# Patient Record
Sex: MALE | Race: WHITE
[De-identification: names, ages, dates, MRNs, and addresses within clinical notes are randomized per-mention and may not be internally consistent; named-entity substitution may affect disease eponyms.]

---

## 2023-05-24 ENCOUNTER — HOSPITAL ENCOUNTER (EMERGENCY)
Age: 33
LOS: 1 days | Discharge: HOME | End: 2023-05-25
Payer: COMMERCIAL

## 2023-05-24 VITALS
RESPIRATION RATE: 18 BRPM | HEART RATE: 88 BPM | TEMPERATURE: 97.88 F | SYSTOLIC BLOOD PRESSURE: 144 MMHG | DIASTOLIC BLOOD PRESSURE: 91 MMHG | OXYGEN SATURATION: 100 %

## 2023-05-24 DIAGNOSIS — R55: Primary | ICD-10-CM

## 2023-05-24 DIAGNOSIS — R68.83: ICD-10-CM

## 2023-05-24 DIAGNOSIS — Z28.310: ICD-10-CM

## 2023-05-24 DIAGNOSIS — B34.9: ICD-10-CM

## 2023-05-24 DIAGNOSIS — F17.220: ICD-10-CM

## 2023-05-24 PROCEDURE — A4216 STERILE WATER/SALINE, 10 ML: HCPCS

## 2023-05-24 PROCEDURE — 85025 COMPLETE CBC W/AUTO DIFF WBC: CPT

## 2023-05-24 PROCEDURE — 80048 BASIC METABOLIC PNL TOTAL CA: CPT

## 2023-05-24 PROCEDURE — 93005 ELECTROCARDIOGRAM TRACING: CPT

## 2023-05-24 PROCEDURE — 99284 EMERGENCY DEPT VISIT MOD MDM: CPT

## 2023-05-25 VITALS
OXYGEN SATURATION: 97 % | RESPIRATION RATE: 15 BRPM | SYSTOLIC BLOOD PRESSURE: 136 MMHG | DIASTOLIC BLOOD PRESSURE: 91 MMHG | HEART RATE: 64 BPM

## 2023-05-25 LAB
ANION GAP: 6 (ref 5–15)
BUN SERPL-MCNC: 11 MG/DL (ref 7–18)
BUN/CREAT RATIO: 9.2 RATIO (ref 10–20)
CALCIUM SERPL-MCNC: 9.2 MG/DL (ref 8.5–10.1)
CARBON DIOXIDE: 29 MMOL/L (ref 21–32)
CHLORIDE: 103 MMOL/L (ref 98–107)
DEPRECATED RDW RBC: 35.2 FL (ref 35.1–43.9)
ERYTHROCYTE [DISTWIDTH] IN BLOOD: 11.6 % (ref 11.6–14.6)
EST GLOM FILT RATE - AFR AMER: 90 ML/MIN (ref 60–?)
ESTIMATED CREATININE CLEARANCE: 95 ML/MIN
GLUCOSE: 132 MG/DL (ref 74–106)
HCT VFR BLD AUTO: 45.4 % (ref 40–54)
HEMOGLOBIN: 15.5 G/DL (ref 13–16.5)
HGB BLD-MCNC: 15.5 G/DL (ref 13–16.5)
IMMATURE GRANULOCYTES COUNT: 0.04 X10^3/UL (ref 0–0)
MCV RBC: 84.4 FL (ref 80–94)
MEAN CORP HGB CONC: 34.1 G/DL (ref 32–36)
MEAN PLATELET VOL.: 9.9 FL (ref 6.2–12)
NRBC FLAGGED BY ANALYZER: 0 % (ref 0–5)
PLATELET # BLD: 247 K/MM3 (ref 150–450)
PLATELET COUNT: 247 K/MM3 (ref 150–450)
POTASSIUM: 3.9 MMOL/L (ref 3.5–5.1)
RBC # BLD AUTO: 5.38 M/MM3 (ref 4.6–6.2)
RBC DISTRIBUTION WIDTH CV: 11.6 % (ref 11.6–14.6)
RBC DISTRIBUTION WIDTH SD: 35.2 FL (ref 35.1–43.9)
WBC # BLD AUTO: 9.8 K/MM3 (ref 4.4–11)
WHITE BLOOD COUNT: 9.8 K/MM3 (ref 4.4–11)

## 2023-07-15 ENCOUNTER — HOSPITAL ENCOUNTER (EMERGENCY)
Age: 33
Discharge: HOME | End: 2023-07-15
Payer: COMMERCIAL

## 2023-07-15 VITALS
RESPIRATION RATE: 16 BRPM | OXYGEN SATURATION: 100 % | BODY MASS INDEX: 24.8 KG/M2 | DIASTOLIC BLOOD PRESSURE: 100 MMHG | HEART RATE: 80 BPM | TEMPERATURE: 97.9 F | SYSTOLIC BLOOD PRESSURE: 154 MMHG

## 2023-07-15 DIAGNOSIS — F41.0: Primary | ICD-10-CM

## 2023-07-15 DIAGNOSIS — Z79.899: ICD-10-CM

## 2023-07-15 DIAGNOSIS — R00.2: ICD-10-CM

## 2023-07-15 DIAGNOSIS — F17.220: ICD-10-CM

## 2023-07-15 LAB
ANION GAP: 5 (ref 5–15)
BUN SERPL-MCNC: 12 MG/DL (ref 7–18)
BUN/CREAT RATIO: 9.9 RATIO (ref 10–20)
CALCIUM SERPL-MCNC: 8.8 MG/DL (ref 8.5–10.1)
CARBON DIOXIDE: 31 MMOL/L (ref 21–32)
CHLORIDE: 103 MMOL/L (ref 98–107)
DEPRECATED RDW RBC: 36.6 FL (ref 35.1–43.9)
ERYTHROCYTE [DISTWIDTH] IN BLOOD: 11.7 % (ref 11.6–14.6)
EST GLOM FILT RATE - AFR AMER: 89 ML/MIN (ref 60–?)
ESTIMATED CREATININE CLEARANCE: 93.35 ML/MIN
GLUCOSE: 99 MG/DL (ref 74–106)
HCT VFR BLD AUTO: 46.7 % (ref 40–54)
HEMOGLOBIN: 16.3 G/DL (ref 13–16.5)
HGB BLD-MCNC: 16.3 G/DL (ref 13–16.5)
IMMATURE GRANULOCYTES COUNT: 0.03 X10^3/UL (ref 0–0)
MCV RBC: 86.2 FL (ref 80–94)
MEAN CORP HGB CONC: 34.9 G/DL (ref 32–36)
MEAN PLATELET VOL.: 10 FL (ref 6.2–12)
NRBC FLAGGED BY ANALYZER: 0 % (ref 0–5)
PLATELET # BLD: 267 K/MM3 (ref 150–450)
PLATELET COUNT: 267 K/MM3 (ref 150–450)
POTASSIUM: 3.5 MMOL/L (ref 3.5–5.1)
RBC # BLD AUTO: 5.42 M/MM3 (ref 4.6–6.2)
RBC DISTRIBUTION WIDTH CV: 11.7 % (ref 11.6–14.6)
RBC DISTRIBUTION WIDTH SD: 36.6 FL (ref 35.1–43.9)
TROPONIN-I HS: 3 PG/ML (ref 3–78)
WBC # BLD AUTO: 8.2 K/MM3 (ref 4.4–11)
WHITE BLOOD COUNT: 8.2 K/MM3 (ref 4.4–11)

## 2023-07-15 PROCEDURE — 85025 COMPLETE CBC W/AUTO DIFF WBC: CPT

## 2023-07-15 PROCEDURE — 93005 ELECTROCARDIOGRAM TRACING: CPT

## 2023-07-15 PROCEDURE — 99283 EMERGENCY DEPT VISIT LOW MDM: CPT

## 2023-07-15 PROCEDURE — 80048 BASIC METABOLIC PNL TOTAL CA: CPT

## 2023-07-15 PROCEDURE — 84484 ASSAY OF TROPONIN QUANT: CPT

## 2023-07-15 PROCEDURE — A4216 STERILE WATER/SALINE, 10 ML: HCPCS

## 2025-01-07 ENCOUNTER — HOSPITAL ENCOUNTER (OUTPATIENT)
Dept: HOSPITAL 100 - VSLAB | Age: 35
Discharge: HOME | End: 2025-01-07
Payer: COMMERCIAL

## 2025-01-07 DIAGNOSIS — I10: Primary | ICD-10-CM

## 2025-01-07 LAB
ALANINE AMINOTRANSFER ALT/SGPT: 64 U/L (ref 16–61)
ALBUMIN SERPL-MCNC: 3.8 G/DL (ref 3.2–5)
ALKALINE PHOSPHATASE: 82 U/L (ref 45–117)
ANION GAP: 5 (ref 5–15)
AST(SGOT): 84 U/L (ref 15–37)
BUN SERPL-MCNC: 15 MG/DL (ref 7–18)
BUN/CREAT RATIO: 12 RATIO (ref 10–20)
CALCIUM SERPL-MCNC: 8.7 MG/DL (ref 8.5–10.1)
CARBON DIOXIDE: 29 MMOL/L (ref 21–32)
CHLORIDE: 103 MMOL/L (ref 98–107)
DEPRECATED RDW RBC: 37.7 FL (ref 35.1–43.9)
ERYTHROCYTE [DISTWIDTH] IN BLOOD: 11.9 % (ref 11.6–14.6)
EST GLOM FILT RATE - AFR AMER: 85 ML/MIN (ref 60–?)
GLOBULIN: 3.5 G/DL (ref 2.2–4.2)
GLUCOSE: 94 MG/DL (ref 74–106)
HCT VFR BLD AUTO: 44.2 % (ref 40–54)
HEMOGLOBIN: 14.8 G/DL (ref 13–16.5)
HGB BLD-MCNC: 14.8 G/DL (ref 13–16.5)
IMMATURE GRANULOCYTES COUNT: 0.03 X10^3/UL (ref 0–0)
MCV RBC: 86.7 FL (ref 80–94)
MEAN CORP HGB CONC: 33.5 G/DL (ref 32–36)
MEAN PLATELET VOL.: 9.9 FL (ref 6.2–12)
NRBC FLAGGED BY ANALYZER: 0 % (ref 0–5)
PLATELET # BLD: 289 K/MM3 (ref 150–450)
PLATELET COUNT: 289 K/MM3 (ref 150–450)
POTASSIUM: 4.1 MMOL/L (ref 3.5–5.1)
RBC # BLD AUTO: 5.1 M/MM3 (ref 4.6–6.2)
RBC DISTRIBUTION WIDTH CV: 11.9 % (ref 11.6–14.6)
RBC DISTRIBUTION WIDTH SD: 37.7 FL (ref 35.1–43.9)
WBC # BLD AUTO: 8.4 K/MM3 (ref 4.4–11)
WHITE BLOOD COUNT: 8.4 K/MM3 (ref 4.4–11)

## 2025-01-07 PROCEDURE — 85025 COMPLETE CBC W/AUTO DIFF WBC: CPT

## 2025-01-07 PROCEDURE — 84443 ASSAY THYROID STIM HORMONE: CPT

## 2025-01-07 PROCEDURE — 36415 COLL VENOUS BLD VENIPUNCTURE: CPT

## 2025-01-07 PROCEDURE — 80053 COMPREHEN METABOLIC PANEL: CPT

## 2025-01-10 NOTE — XMS RPT_ITS
Comprehensive CCD (C-CDA v2.1)  
  
                          Created on: January 10, 2025  
  
  
SANTO MR. AZAR PAGAN  
External Reference #: CDR,[id number]:[id number]  
: 1990  
Sex: Male  
  
Demographics  
  
  
                                        Address             [address] [place]  [address]  
   
                                        Home Phone          [phone]  
   
                                        Work Phone          [phone]  
   
                                        Home Phone          [phone]  
   
                                        Work Phone          [phone]  
   
                                        Preferred Language  English  
   
                                        Marital Status        
   
                                        Anglican Affiliation Unknown  
   
                                        Race                White  
   
                                        Ethnic Group        Unknown  
  
  
Author  
  
  
                                        Organization        [facility]  
[facility]  
  
  
Care Team Providers  
  
  
                                Care Team Member Name Role            Phone  
   
                                FANNY BAILEY Attending       Unavailrandall goodman  
  
  
  
Results  
  
  
                      Test Name  Value      Interpretation Reference Range Facil  
ity  
   
                                                    Basic metabolic 2000 panelon  
 2023   
   
                                                    Anion gap   
[Moles/Vol]     12 mmol/L       Normal          9-18            [facility]  
   
                                        Comment on above:   Order Comment: Speci  
men Type: BLOOD SPECIMEN  
Ordering Facility: [facility]  
Address: [address] [place] [address]   
   
                                                            Performed By: #### 2  
4321-2 ####  
[facility]  
CLIA [id number]  
[address] [place] [address] [place]   
   
                      Calcium [Mass/Vol] 10.4 mg/dL High       8.5-10.2   [facility]  
   
                                        Comment on above:   Order Comment: Speci  
men Type: BLOOD SPECIMEN  
Ordering Facility: [facility]  
Address: [address] [place] [address]   
   
                                                            Performed By: #### 2  
4321-2 ####  
[facility]  
CLIA [id number]  
[address] [place] [address] [place]   
   
                                                    Chloride   
[Moles/Vol]     100 mmol/L      Normal                    [facility]  
   
                                        Comment on above:   Order Comment: Speci  
men Type: BLOOD SPECIMEN  
Ordering Facility: [facility]  
Address: [address] [place] [address]   
   
                                                            Performed By: #### 2  
4321-2 ####  
[facility]  
CLIA [id number]  
[address] [place] [id number] [place]   
   
                      CO2 [Moles/Vol] 27 mmol/L  Normal     22-30      [facility]  
   
                                        Comment on above:   Order Comment: Speci  
men Type: BLOOD SPECIMEN  
Ordering Facility: [facility]  
Address: [address] [place] [address]   
   
                                                            Performed By: #### 2  
4321-2 ####  
[facility]  
CLIA [id number]  
[facility] [place] [address] [place]   
   
                                                    Creatinine   
[Mass/Vol]      1.16 mg/dL      Normal          0.73-1.22       [facility]  
   
                                        Comment on above:   Order Comment: Speci  
men Type: BLOOD SPECIMEN  
Ordering Facility: [facility]  
Address: [address] [place] [address]   
   
                                                            Performed By: #### 2  
4321-2 ####  
[facility]  
CLIA [id number]  
[address] [place] [address] [place]   
   
                                                    Creatinine and   
Glomerular   
filtration   
rate.predicted   
panel (S/P/Bld) 86 mL/min/1.73m??? Normal          >=60            [facility]  
   
                                        Comment on above:   Order Comment: Speci  
men Type: BLOOD SPECIMEN  
Ordering Facility: [facility]  
Address: [address] [place] [address]   
   
                                                            Result Comment: Sweetie  
mated Glomerular Filtration Rate (eGFR) is   
calculated using the  CKD-EPI creatinine equation. This   
equation utilizes serum creatinine, sex, and age as parameters.   
The creatinine assay has traceable calibration to isotope   
dilution-mass spectrometry. Refer to KDIGO guidelines for clinical   
interpretation. In patients with unstable renal function, e.g.   
those with acute kidney injury, the eGFR may not accurately   
reflect actual GFR.   
   
                                                            Performed By: #### 2  
4321-2 ####  
[facility]  
CLIA [id number]  
[address] [place] [address] [place]   
   
                      Glucose [Mass/Vol] 84 mg/dL   Normal     74-99      [facility]  
   
                                        Comment on above:   Order Comment: Speci  
men Type: BLOOD SPECIMEN  
Ordering Facility: [facility]  
Address: [address] [place] [address], [place] [address]   
   
                                                            Result Comment: The   
American Diabetes Association (ADA) provides   
guidance for cutoff values for fasting glucose and random glucose.   
The ADA defines fasting as no caloric intake for at least 8 hours.   
Fasting plasma glucose results between 100 to 125 mg/dL indicate   
increased risk for diabetes (prediabetes).  
Fasting plasma glucose results greater than or equal to 126 mg/dL   
meet the criteria for diagnosis of diabetes. In the absence of   
unequivocal hyperglycemia, results should be confirmed by repeat   
testing. In a patient with classic symptoms of hyperglycemia or   
hyperglycemic crisis, random plasma glucose results greater than   
or equal to 200 mg/dL meet the criteria for diagnosis of diabetes.  
Reference: Standards of Medical Care in Diabetes 2016, American   
Diabetes Association. Diabetes Care. 2016.39(Suppl 1).   
   
                                                            Performed By: #### 2  
4321-2 ####  
[facility]  
CLIA [id number]  
[address] [place] [address] [place]   
   
                                                    Potassium   
[Moles/Vol]     4.5 mmol/L      Normal          3.7-5.1         [facility]  
   
                                        Comment on above:   Order Comment: Speci  
men Type: BLOOD SPECIMEN  
Ordering Facility: [facility]  
Address: [address] [place] [address]   
   
                                                            Performed By: #### 2  
4321-2 ####  
[facility]  
CLIA [id number]  
[address] [place] [address] [place]   
   
                      Sodium [Moles/Vol] 139 mmol/L Normal     136-144    [facility]  
   
                                        Comment on above:   Order Comment: Speci  
men Type: BLOOD SPECIMEN  
Ordering Facility: [facility]  
Address: [address] [place] [address]   
   
                                                            Performed By: #### 2  
4321-2 ####  
[facility]  
CLIA [id number]  
[address] [place] [id number] [place]   
   
                                                    Urea nitrogen   
[Mass/Vol]      10 mg/dL        Normal          9-24            [facility]  
   
                                        Comment on above:   Order Comment: Speci  
men Type: BLOOD SPECIMEN  
Ordering Facility: [facility]  
Address: [address] [place] [address]   
   
                                                            Performed By: #### 2  
4321-2 ####  
[facility]  
CLIA [id number]  
[address] [place] [address] [place]   
   
                                                    25(OH)D3 [facility] 2023   
   
                                                    25-hydroxyvitamin   
D3 [Mass/Vol]   22.4 ng/mL      Low             31.0-80.0       [facility]  
   
                                        Comment on above:   Order Comment: Speci  
men Type: BLOOD SPECIMEN  
Ordering Facility: [facility]  
Address: [address] [place] [address]   
   
                                                            Result Comment: Clas  
sification of 25 [place] Vitamin D status:  
Deficiency/Insufficiency: < or = 30 ng/ml.  
Sufficiency/Optimal Levels: 31-80 ng/mL  
Toxicity: > 100 ng/mL.  
Test performed by chemiluminescent immunoassay.   
   
                                                            Performed By: #### 1  
989-3 ####  
[facility]  
CLIA [id number]  
[address] [place] [address] [place]   
   
                                                    CBC panel Auto (Bld)on    
   
                                                    Erythrocyte   
distribution width   
(RBC) [Ratio]   12.2 %          Normal          11.5-15.0       [facility]  
   
                                        Comment on above:   Order Comment: Speci  
men Type: BLOOD SPECIMEN  
Ordering Facility: [facility]  
Address: [address] [place] [address]   
   
                                                            Performed By: #### 5  
8410-2 ####  
[facility]  
CLIA [id number]  
[address] [place] [address] [place]   
   
                                                    Hematocrit (Bld)   
[Volume fraction] 48.1 %          Normal          39.0-51.0       [facility]  
   
                                        Comment on above:   Order Comment: Speci  
men Type: BLOOD SPECIMEN  
Ordering Facility: [facility]  
Address: [address] [place] [address]   
   
                                                            Performed By: #### 5  
8410-2 ####  
[facility]  
CLIA [id number]  
[address] [place] [address] [place]   
   
                                                    Hemoglobin (Bld)   
[Mass/Vol]      15.5 g/dL       Normal          13.0-17.0       [facility]  
   
                                        Comment on above:   Order Comment: Speci  
men Type: BLOOD SPECIMEN  
Ordering Facility: [facility]  
Address: [address] [place] [address]   
   
                                                            Performed By: #### 5  
8410-2 ####  
[facility]  
IA [id number]  
[address] [place] [address]   
   
                                                    MCH (RBC) [Entitic   
mass]           29.5 pg         Normal          26.0-34.0       [facility]  
   
                                        Comment on above:   Order Comment: Speci  
men Type: BLOOD SPECIMEN  
Ordering Facility: [facility]  
Address: [address] [place] [address]   
   
                                                            Performed By: #### 5  
8410-2 ####  
[facility]  
IA [id number]  
[address] [place] [address] [place]   
   
                                                    MCHC (RBC)   
[Mass/Vol]      32.2 g/dL       Normal          30.5-36.0       [facility]  
   
                                        Comment on above:   Order Comment: Speci  
men Type: BLOOD SPECIMEN  
Ordering Facility: [facility]  
Address: [address] [place] [address]   
   
                                                            Performed By: #### 5  
8410-2 ####  
[facility]  
IA [id number]  
[address] [place] [address] [place]   
   
                                                    MCV (RBC) [Entitic   
vol]            91.4 fL         Normal          80.0-100.0      [facility]  
   
                                        Comment on above:   Order Comment: Speci  
men Type: BLOOD SPECIMEN  
Ordering Facility: [facility]  
Address: [address] [place] [address]   
   
                                                            Performed By: #### 5  
8410-2 ####  
[facility]  
IA [id number]  
[address] [place] [address] [place]   
   
                                                    Nucleated RBC (Bld)   
[#/Vol]         10*3/uL         Normal          <0.01           [facility]  
   
                                        Comment on above:   Order Comment: Speci  
men Type: BLOOD SPECIMEN  
Ordering Facility: [facility]  
Address: [address] [place] [address]   
   
                                                            Performed By: #### 5  
8410-2 ####  
[facility]  
CLIA [id number]  
[address] [address] [place]   
   
                                                    Platelet mean   
volume (Bld)   
[Entitic vol]   11.0 fL         Normal          9.0-12.7        [facility]  
   
                                        Comment on above:   Order Comment: Speci  
men Type: BLOOD SPECIMEN  
Ordering Facility: [facility]  
Address: [address] [place] [address]   
   
                                                            Performed By: #### 5  
8410-2 ####  
[facility]  
CLIA [id number]  
[address] [place] [address] [place]   
   
                                                    Platelets (Bld)   
[#/Vol]         264 10*3/uL     Normal          150-400         [facility]  
   
                                        Comment on above:   Order Comment: Speci  
men Type: BLOOD SPECIMEN  
Ordering Facility: [facility]  
Address: [address] [place] [address]   
   
                                                            Performed By: #### 5  
8410-2 ####  
[facility]  
CLIA [id number]  
[address] [place] [address] [place]   
   
                      RBC (Bld) [#/Vol] 5.26 10*6/uL Normal     4.20-6.00  [facility]  
   
                                        Comment on above:   Order Comment: Speci  
men Type: BLOOD SPECIMEN  
Ordering Facility: [facility]  
Address: [address] [place] [address]   
   
                                                            Performed By: #### 5  
8410-2 ####  
[facility]  
CLIA [id number]  
[address] [place] [address] [place]   
   
                      WBC (Bld) [#/Vol] 7.75 10*3/uL Normal     3.70-11.00 [facility]  
   
                                        Comment on above:   Order Comment: Speci  
men Type: BLOOD SPECIMEN  
Ordering Facility: [facility]  
Address: [address] [place] [address]   
   
                                                            Performed By: #### 5  
8410-2 ####  
[facility]  
CLIA [id number]  
[address] [place], [place] [id number] [place]   
   
                                                    Comprehensive metabolic 2000  
 panelon 2023   
   
                      Albumin [Mass/Vol] 4.7 g/dL   Normal     3.9-4.9    [facility]  
   
                                        Comment on above:   Order Comment: Speci  
men Type: BLOOD SPECIMEN  
Ordering Facility: [facility]  
Address: [address] [place] [address], [place] [address]   
   
                                                            Performed By: #### 3  
016-3, 96184-5 ####  
[facility]  
CLIA [id number]  
[address] [place] [address] [place]   
   
                                                    ALP [Catalytic   
activity/Vol]   50 U/L          Normal                    [facility]  
   
                                        Comment on above:   Order Comment: Speci  
men Type: BLOOD SPECIMEN  
Ordering Facility: [facility]  
Address: [address] [place] [address], [place] [address]   
   
                                                            Performed By: #### 3  
016-3, 52721-2 ####  
[facility]  
CLIA [id number]  
[address] [place] [address] [place]   
   
                                                    ALT [Catalytic   
activity/Vol]   45 U/L          Normal          10-54           [facility]  
   
                                        Comment on above:   Order Comment: Speci  
men Type: BLOOD SPECIMEN  
Ordering Facility: [facility]  
Address: [address] [place] [address], [place] [address]   
   
                                                            Performed By: #### 3  
016-3, 02907-8 ####  
[facility]  
CLIA [id number]  
[address] [place] [address] [place]   
   
                                                    Anion gap   
[Moles/Vol]     11 mmol/L       Normal          9-18            [facility]  
   
                                        Comment on above:   Order Comment: Speci  
men Type: BLOOD SPECIMEN  
Ordering Facility: [facility]  
Address: [address] [place] [address], [place] [id number]   
   
                                                            Performed By: #### 3  
016-3, 82848-2 ####  
[facility]  
CLIA [id number]  
[address] [place] [id number] [place]   
   
                                                    AST [Catalytic   
activity/Vol]   40 U/L          Normal          14-40           [facility]  
   
                                        Comment on above:   Order Comment: Speci  
men Type: BLOOD SPECIMEN  
Ordering Facility: [facility]  
Address: [address], [place] [address]   
   
                                                            Performed By: #### 3  
016-3,  ####  
[facility]  
CLIA [id number]  
[address] [place] [address] [place]   
   
                                                    Bilirubin   
[Mass/Vol]      0.3 mg/dL       Normal          0.2-1.3         [facility]  
   
                                        Comment on above:   Order Comment: Speci  
men Type: BLOOD SPECIMEN  
Ordering Facility: [facility]  
Address: [address] [place] [address] RD, [place] [address]   
   
                                                            Performed By: #### 3  
016-3, 22280-5 ####  
[facility]  
CLIA [id number]  
[address] [place] [address] [place]   
   
                      Calcium [Mass/Vol] 9.5 mg/dL  Normal     8.5-10.2   [facility]  
   
                                        Comment on above:   Order Comment: Speci  
men Type: BLOOD SPECIMEN  
Ordering Facility: [facility]  
Address: [address] [place] [address] RD, [place] [address]   
   
                                                            Performed By: #### 3  
016-3, 38883-7 ####  
[facility]  
CLIA [id number]  
[address] [place] [address] [place]   
   
                                                    Chloride   
[Moles/Vol]     102 mmol/L      Normal                    [facility]  
   
                                        Comment on above:   Order Comment: Speci  
men Type: BLOOD SPECIMEN  
Ordering Facility: [facility]  
Address: [address], [place] [address]   
   
                                                            Performed By: #### 3  
016-3, 11356-7 ####  
[facility]  
CLIA [id number]  
[address] [place] [address] [place]   
   
                      CO2 [Moles/Vol] 26 mmol/L  Normal     22-30      [facility]  
   
                                        Comment on above:   Order Comment: Speci  
men Type: BLOOD SPECIMEN  
Ordering Facility: [facility]  
Address: [address] [place] [address] RD, [place] [address]   
   
                                                            Performed By: #### 3  
016-3, 33642-8 ####  
[facility]  
CLIA [id number]  
[address] [place] [address] [place]   
   
                                                    Creatinine   
[Mass/Vol]      1.28 mg/dL      High            0.73-1.22       [facility]  
   
                                        Comment on above:   Order Comment: Speci  
men Type: BLOOD SPECIMEN  
Ordering Facility: [facility]  
Address: [address] [place] [address], [place] [address]   
   
                                                            Performed By: #### 3  
016-3, 61313-4 ####  
[facility]  
CLIA [id number]  
[address] [place] [address] [place]   
   
                                                    ESTIMATED   
GLOMERULAR   
FILTRATION RATE 76 mL/min/1.73m??? Normal          >=60            [facility]  
   
                                        Comment on above:   Order Comment: Speci  
men Type: BLOOD SPECIMEN  
Ordering Facility: [facility]  
Address: [address] [place] [address], [place] [address]   
   
                                                            Result Comment: Sweetie  
mated Glomerular Filtration Rate (eGFR) is   
calculated using the  CKD-EPI creatinine equation. This   
equation utilizes serum creatinine, sex, and age as parameters.   
The creatinine assay has traceable calibration to isotope   
dilution-mass spectrometry. Refer to KDIGO guidelines for clinical   
interpretation. In patients with unstable renal function, e.g.   
those with acute kidney injury, the eGFR may not accurately   
reflect actual GFR.   
   
                                                            Performed By: #### 3  
016-3, 72157-3 ####  
[facility]  
CLIA [id number]  
[address] [place] [address] [place]   
   
                      Glucose [Mass/Vol] 78 mg/dL   Normal     74-99      [facility]  
   
                                        Comment on above:   Order Comment: Speci  
men Type: BLOOD SPECIMEN  
Ordering Facility: [facility]  
Address: [address] [place] [address], [place] [id number]   
   
                                                            Result Comment: The   
American Diabetes Association (ADA) provides   
guidance for cutoff values for fasting glucose and random glucose.   
The ADA defines fasting as no caloric intake for at least 8 hours.   
Fasting plasma glucose results between 100 to 125 mg/dL indicate   
increased risk for diabetes (prediabetes).  
Fasting plasma glucose results greater than or equal to 126 mg/dL   
meet the criteria for diagnosis of diabetes. In the absence of   
unequivocal hyperglycemia, results should be confirmed by repeat   
testing. In a patient with classic symptoms of hyperglycemia or   
hyperglycemic crisis, random plasma glucose results greater than   
or equal to 200 mg/dL meet the criteria for diagnosis of diabetes.  
Reference: Standards of Medical Care in Diabetes 2016, American   
Diabetes Association. Diabetes Care. 2016.39(Suppl 1).   
   
                                                            Performed By: #### 3  
-3, 57784-4 ####  
[facility]  
CLIA [id number]  
[address] [place] [address] [place]   
   
                                                    Potassium   
[Moles/Vol]     4.4 mmol/L      Normal          3.7-5.1         [facility]  
   
                                        Comment on above:   Order Comment: Speci  
men Type: BLOOD SPECIMEN  
Ordering Facility: [facility]  
Address: [address] [place] [address]   
   
                                                            Performed By: #### 3  
016-3, 74602-4 ####  
[facility]  
CLIA [id number]  
[address] [place] [address] [place]   
   
                      Protein [Mass/Vol] 7.2 g/dL   Normal     6.3-8.0    [facility]  
   
                                        Comment on above:   Order Comment: Speci  
men Type: BLOOD SPECIMEN  
Ordering Facility: [facility]  
Address: [address] [place] [address]   
   
                                                            Performed By: #### 3  
3, 00613-3 ####  
[facility]  
CLIA [id number]  
[address] [place] [id number] [place]   
   
                      Sodium [Moles/Vol] 139 mmol/L Normal     136-144    [facility]  
   
                                        Comment on above:   Order Comment: Speci  
men Type: BLOOD SPECIMEN  
Ordering Facility: [facility]  
Address: [address] [place] [address]   
   
                                                            Performed By: #### 3  
3, 08602-0 ####  
[facility]  
CLIA [id number]  
[address] [place] [id number] [place]   
   
                                                    Urea nitrogen   
[Mass/Vol]      11 mg/dL        Normal          9-24            [facility]  
   
                                        Comment on above:   Order Comment: Speci  
men Type: BLOOD SPECIMEN  
Ordering Facility: [facility]  
Address: [address] [place] [address]   
   
                                                            Performed By: #### 3  
016-3, 39111-1 ####  
[facility]  
CLIA [id number]  
[address] [place] [address] [place]   
   
                                                    HbA1c (Bld)on 2023   
   
                                                    Average glucose   
Estimated from   
glycated hemoglobin   
(Bld) [Mass/Vol] 97 mg/dL        Normal                          [facility]  
   
                                        Comment on above:   Order Comment: Speci  
men Type: BLOOD SPECIMEN  
Ordering Facility: [facility]  
Address: [address] [place] [address]   
   
                                                            Result Comment: eAG:  
 (Estimated average glucose) is a calculated   
value from HgbA1c and is representative of the average blood   
glucose level in the last 2-3 month period.   
   
                                                            Performed By: #### 5  
5454-3 ####  
[facility]  
CLIA [id number]  
[facility] [place] [address] [place]   
   
                                                    HbA1c (Bld) [Mass   
fraction]       5.0 %           Normal          4.3-5.6         [facility]  
   
                                        Comment on above:   Order Comment: Trae  
men Type: BLOOD SPECIMEN  
Ordering Facility: [facility]  
Address: [address] [place] [address]   
   
                                                            Result Comment: Amer  
ican Diabetes Association guidelines indicate   
that patients with HgbA1c in the range 5.7-6.4% are at increased   
risk for development of diabetes, and intervention by lifestyle   
modification may be beneficial. HgbA1c greater or equal to 6.5% is   
considered diagnostic of diabetes.   
   
                                                            Performed By: #### 5  
5454-3 ####  
[facility]  
CLIA [id number]  
[facility] [place] [address] [place]   
   
                                                    TSH SerPl-aCncon 2023   
   
                      TSH Qn     1.710 m[IU]/L Normal     0.270-4.200 [facility]  
   
                                        Comment on above:   Order Comment: Trae  
men Type: BLOOD SPECIMEN  
Ordering Facility: [facility]  
Address: [address] [place] [id number]   
   
                                                            Performed By: #### 3  
016-3, 18599-4 ####  
[facility]  
CLIA [id number]  
[address] [facility]  
[id number] [address] [place]   
   
                                                    CORONAVIRUS 2019 BY PCRon 03  
-   
   
                                                    CORONAVIRUS   
2019,PCR        NOT DETECTED    Normal          Not Detected    [facility]  
   
                                        Comment on above:   Result Comment: .  
This assay is designed to detect the ORF1ab and/or S genes of   
SARS-CoV-2 via  
nucleic acid amplification. A Not Detected result does not   
preclude  
2019-nCoV infection since the adequacy of sample collection and/or   
low viral  
burden may result in presence of viral nucleic acids below the   
clinical  
sensitivity of this test method.  
Fact sheet for providers: www.fda.gov/media/808077/download  
Fact sheet for patients: www.fda.gov/media/892734/download  
This test has received FDA Emergency Use Authorization (EUA) and   
has been  
verified by [facility] ([facility]).   
This test  
is only authorized for the duration of time that circumstances   
exist to  
justify the authorization of the emergency use of in vitro   
diagnostic tests  
for the detection of SARS-CoV-2 virus and/or diagnosis of COVID-19   
infection  
under section 564(b)(1) of the Act, 21 U.S.C. 360bbb-3(b)(1),   
unless the  
authorization is terminated or revoked sooner.  
[facility] is certified under   
CLIA-88 as  
qualified to perform high complexity testing. Testing is performed   
in the  
[facility] laboratories located at [address] [place] [address].   
   
                                                            Performed By: #### C  
OV19 ####  
[facility]  
[address]  
[place] [address]   
   
                                                    DATE OF SYMPTOM   
ONSET [YYYYMMDD]? 2021        Normal                          [facility]  
   
                                        Comment on above:   Performed By: #### C  
OV19 ####  
[facility]  
[address]  
[place] [address]   
   
                      Lab Specimen Source Nasal, Nasopharyngeal Normal            
      [facility]  
   
                                        Comment on above:   Performed By: #### C  
OV19 ####  
[facility]  
[address]  
[place] [address]   
   
                                                    Covid 19 Resultson 03-  
1   
   
                                        Covid 19 Results    NEGATIVE COVID-19 Te  
st  
  
Coronaviruses are   
common world-wide and   
are the cause of many   
common colds.  
SARS-COV2 is a new   
coronavirus that began   
circulating worldwide   
in 2019 so we  
are calling it   
COVID-19. It has been   
estimated that four   
out of five patients  
with COVID-19 will   
recover at home   
without the need for   
medical attention.  
Symptoms of COVID-19   
include cough, fever,   
shortness of breath,   
loss of taste  
or smell and other   
flu-like symptoms   
including chills, sore   
muscles, sore  
throat, and headache.   
Severe illness is more   
common in older people   
and people  
with other health   
problems such as high   
blood pressure,   
obesity, and immune  
system problems.  
  
If the test is   
positive, you have   
COVID-19. You will be   
contacted by the  
ordering physicians   
office and instructed   
to remain on home   
isolation, in  
accordance with CDC   
guidelines. You may   
also be contacted by   
the [facility]   
to see if any of your   
close contacts may   
have been exposed  
to the virus and need   
to quarantine.  
  
If the test is   
negative, you likely   
do not have COVID-19   
at this time, but you  
still may have a   
different illness that   
can spread to other   
people (like  
Influenza, or the Flu)   
and could still be at   
risk for getting   
COVID-19. We  
recommend that you   
stay away from other   
people to limit the   
spread of illness  
until your symptoms   
are improving and you   
are fever-free for 24   
hours without  
the use of fever   
lowering medications   
such as acetaminophen   
or ibuprofen. No  
test is 100% accurate   
so if you are still   
concerned you may have   
COVID-19, talk  
to your doctor about   
the need to continue   
to stay away from   
others.  
  
Medicines  
  
Acetaminophen (Tylenol   
and others) is   
generally safe.   
Anti-inflammatory  
medications, such as   
Ibuprofen (Advil or   
Motrin) or Naproxen   
(Aleve) can  
also be used.   
Over-the-counter cough   
and cold medicines can   
be used according  
to the instructions on   
the package. Some   
over-the-counter   
medicines also  
contain acetaminophen.   
Make sure you are not   
taking more than your   
recommended  
dose For those not   
hospitalized, there is   
no specific treatment   
available for  
this illness.   
Antibiotics do not   
treat Coronaviruses.  
  
Follow-Up  
  
Follow up with your   
doctor by scheduling a   
virtual visit or   
consider follow-up  
at one of our urgent   
care fever clinics. If   
you are having   
difficulty  
breathing, or are very   
weak and having   
difficulty standing,   
this is a medical  
emergency. Call 911 or   
have someone take you   
to the nearest   
emergency room  
immediately. If   
possible, wear a   
facemask.  
  
Additional guidance   
from the CDC for   
patients who tested   
POSITIVE for COVID-19  
  
How to isolate:  
  
Isolate yourself in a   
specific room at home   
and limit your contact   
with others.  
Use a separate   
bathroom from other   
members of the   
household, when   
possible.  
Leave home only to get   
essential medical   
care. Do not go to   
work, school or  
public areas.  
Avoid using public   
transportation,   
ride-sharing, or   
taxis.  
Restrict contact with   
pets and other   
animals. If you must   
care for your pet or  
be around animals   
while you are sick,   
wash your hands before   
and after your  
interaction and wear a   
facemask.  
Make sure that shared   
spaces in the home   
have good airflow,   
such as by an air  
conditioner or an   
opened window, weather   
permitting.  
  
Personal Hygiene   
Procedures:  
Wear a face mask when   
in the same room as   
other people or pets.   
If a face mask  
interferes with your   
breathing, others   
should wear a mask   
when sharing space  
with you.  
Frequent hand-washing:   
wash your hands with   
soap and water for at   
least 20  
seconds. If soap and   
water are not   
available, use   
alcohol-based hand   
.  
Avoid touching your   
eyes, nose, and mouth   
with unwashed hands.  
  
Household Hygiene   
Procedures:  
Avoid sharing personal   
household items such   
as dishes, glassware,   
cups, eating  
utensils, towels or   
bedding with other   
people or pets in your   
home. After use,  
these items should be   
washed with soap and   
hot water.  
Disinfect all   
high-touch surfaces   
every day with   
antibacterial cleaning  
solutions such as   
Lysol wipes, bleach,   
cleansers, etc.   
High-touch surfaces  
include tabletops,   
doorknobs, bathroom   
fixtures, toilets,   
phones, keyboards,  
tablets and bedside   
tables.  
Immediately clean any   
surfaces that may have   
blood, poop or body   
fluids on  
them, using   
antibacterial cleaning   
solutions such as   
Lysol wipes, bleach,  
cleansers, etc.  
If clothing or bedding   
come into contact with   
blood, poop or body   
fluids, they  
should be washed   
immediately. Follow   
the directions on the   
laundry detergent  
and clothing labels   
but hot water is   
recommended when   
possible.  
  
Stopping home   
isolation precautions:  
If possible, consult   
your doctor before   
stopping home   
isolation precautions.  
According to the CDC,   
you can discontinue   
home isolation   
precautions when you  
have met both of these   
criteria:  
Your fever and   
respiratory symptoms   
have been gone for 24   
hours without the use  
of any medicines like   
ibuprofen (Motrin) and   
acetaminophen   
(Tylenol).  
It has been at least   
10 days since your   
symptoms first   
appeared. If you are  
immunosuppressed OR   
you were admitted to   
the hospital for this,   
you should wait  
until it has been 14   
days since your   
symptoms first   
appeared.  
  
Guidelines for Those   
Living With and/or   
Caring For Persons   
with COVID-19:  
Read and follow all   
the recommendations   
outlined in this   
handout.  
Do not permit visitors   
in the home unless   
there is an essential   
need.  
Wear a facemask when   
in the same room as   
the patient.  
Wear a facemask and   
gloves (disposable if   
available) when you   
touch or have  
contact with the   
patient's blood, poop,   
or body fluids   
including saliva,  
phlegm, nasal mucus,   
vomit or urine. Clean   
or throw away   
facemasks and gloves  
after use and wash   
your hands with soap   
and water.  
You will need to   
quarantine (stay away   
from others) for 14   
days after your last  
contact with your   
family member with   
COVID-19. The person   
with COVID-19 is  
considered contagious   
48 hours prior to   
symptoms beginning (or   
starting with  
the day of the   
positive test if they   
have no symptoms) for   
a total of 10 days.  
  
Additional resources:  
[facility]   
Health COVID Hotline   
at 6-091-4GECDEQ   
(1-761.294.3857).  
  
COVID-19 Careline at   
1-764.581.5220(availab  
le 24 hours per day,   
seven days a  
week if you or a loved   
one is experiencing   
anxiety related to the   
coronavirus  
pandemic).  
  
Clinical research   
opportunities:  is   
conducting research   
studies to develop  
better testing and   
treatments for COVID.   
Do you want any   
information on how to  
participate Call   
[phone].  
  
Websites:   
hospitals.org or   
www.CDC.gov  
  
Follow My Health / My   
[facility] (for other test   
results):[phone]  
5  
Revised 2020  
  
  
Electronic Signatures:  
Carlos Gonzalez (ADMIN)   
(Signature pending)  
Authored  
  
Last Updated:   
10-Mar-2021 20:43 by   
Carlos   
[facility]  
   
                                                    Provider Note - ED v2on    
   
                                                    Provider Note - ED   
v2                                      Provider Note - ED v2:  
Chart Review:  
HISTORY OF PRESENTING   
ILLNESS  
AZAR is a 30 year old   
Male and was seen by   
me at 10-Mar-2021   
09:51.  
  
Triage Information:   
Most recent Vital Sign   
Value Date  
  
  
  
  
PAST MEDICAL HISTORY  
ATTESTATION: I have   
reviewed and confirmed   
nurse's/medic's notes   
for patient's  
medications,   
allergies, and   
medical, surgical,   
family and social   
history  
  
ALLERGIES/INTOLERANCES  
: No Known Allergies  
  
HEALTH HISTORY: No   
documented data.  
  
OUTPATIENT   
MEDICATIONS: Home   
Medications Review   
Status for   
Reconciliation:  
Complete  
Med Status: Patient   
Currently Takes   
Medications  
  
Drug Name:   
amoxicillin-clavulanat  
e 875 mg-125 mg oral   
tablet  
Instructions: 875   
milligram(s) orally 2   
times a day  
  
SIGNIFICANT EVENTS: No   
documented data.  
  
  
  
RESULTS/VITAL SIGNS  
  
VITAL SIGNS:  
  
T PRBP SpO2O2(LPM)   
%FiO2 Method  
10-Mar-2021   
09:56:[id number]  
9 98  
  
  
  
  
  
  
  
MEDICAL DECISION   
MAKING/ED COURSE  
MDM/ED COURSE:  
This note was   
generated with voice   
recognition software   
and may contain errors  
including spelling,   
grammar, syntax, and   
misrecognization of   
what was dictated  
  
  
  
Chief Complaint  
  
COVID symptoms  
  
  
  
History of Present   
Illness  
  
Patient presents with   
COVID symptoms for   
approximately 7 days.   
Symptoms  
include body aches,   
sinus pressure, sore   
throat, headache.   
Patient denies use  
of any   
over-the-counter   
medications at home   
prior to arrival for   
symptom  
management. Only time   
has made their   
symptoms worse or   
nothing makes them  
better.  
  
  
  
Review of Systems  
  
10 systems reviewed   
negative with   
exception of history   
of present illness  
listed above  
  
  
  
Physical Examination  
  
  
  
General: Alert and   
oriented, No acute   
distress.  
  
Eye: Pupils are equal,   
round and reactive.  
  
HENT: Normocephalic  
  
Neck: Supple  
  
Respiratory:   
Respirations are   
non-labored,   
Symmetrical chest wall   
expansion  
  
Musculoskeletal:   
Normal range of   
motion, normal   
strength, no   
tenderness, no  
swelling.  
  
Integumentary: Pink,   
warm, dry, and Intact.  
  
Neurologic: Alert,   
Oriented, Normal   
sensory, Normal motor   
function.  
  
Cognition and Speech:   
Oriented, Speech clear   
and coherent.  
  
Psychiatric:   
Cooperative,   
Appropriate mood &   
affect.  
  
  
  
Impression and Plan  
  
  
  
Course: Worsening  
  
Plan: I reviewed the   
 COVID-19 algorithm,   
reviewed the algorithm   
& symptoms  
with the patient, and   
counseled the patient   
on COVID-19 current  
recommendations. It   
was determined the   
patient's symptoms are   
consistent with  
need for testing for   
COVID-19, using proper   
PPE, we were able to   
obtain a nasal  
swab without incident.   
Patient is instructed   
to isolate at home   
until a  
negative test result   
is returned. Patient   
agrees with plan of   
care, questions  
were encouraged and   
answered. Oral   
antibiotics will be   
sent to local pharmacy  
with instructions to   
discontinue the use in   
the presence of a   
positive  
rotavirus test  
Patient Instructions:   
COVID-19  
  
  
  
  
  
  
  
CLINICAL IMPRESSION  
Diagnosis/Annotation:   
ED Dx  
Name:Acute sinusitis  
Code:J01.90  
  
Disposition:   
discharged  
  
Type: home  
  
  
ATTESTATION  
  
  
CRITICAL CARE TIME  
Is this a critically   
ill patient: no  
  
  
  
  
  
Electronic Signatures:  
Adrien Solis   
(APRN-CNP) (Signed   
10-Mar-2021 10:28)  
Authored: HPI, PMH,   
PE, Results/Vital   
Signs, MDM/ED Course,   
Clinical  
Impression,   
Attestation, Chart   
Review, Scores  
  
  
Last Updated:   
10-Mar-2021 10:28 by   
Adrien Solis   
(APRN-CNP)          [facility]  
  
  
  
Encounters  
  
  
                          Encounter Date Encounter Type Care Provider Facility  
   
                                                    Start: 2023  
End: 2023     ambulatory          FANNY BAILEY Facility:[facility]  
  
  
  
Payers  
  
  
                          Date         Payer Category Payer        Policy ID  
   
                          2023   Unknown                   [id number]  
  
  
  
Progress note 2023  
  
  
                                Note Date & Type Note            Facility  
   
                                        2023 Note     [facility] ID: [id number]  
Author: Fanny Bailey OD  
Service: ?  
Author Type: OPTOMETRIST  
Type: Progress Notes  
Filed: 2023 11:57 AM  
Note Text:  
ASSESSMENT/PLAN:  
1. Regular astigmatism, bilateral - ICD9:   
367.21, ICD10: H52.223  
Continue to wear his glasses with the   
update.  
Continue to monitor his ocular health and   
optic nerve appearance.  
Recommended yearly exams  
Fanny Bailey, OD  
I have confirmed and edited as necessary   
the relevant ophthalmic history,  
ROS, and the neuro exam findings as   
obtained by others.                     [facility]  
  
  
  
Summary Purpose  
  
  
                                                      
  
  
  
Family History  
No Family History Records FoundNo Family History Records Found  
  
Advance Directives  
No Advanced Directives Records FoundNo Advanced Directives Records Found  
  
Additional Source Comments  
  
  
  
                                                    PREGNANCY (unrecognized sect  
ion and content)  
   
                                                    No Pregnancy Status Records   
FoundNo Pregnancy Status Records Found  
  
  
  
  
                                                    INFORMATION SOURCE (unrecogn  
ized section and content)  
   
                                          
  
  
  
                                        DATE CREATED        AUTHOR  
   
                                2021                      [facility]  
  
  
  
                                DATE CREATED    AUTHOR          AUTHOR'S ORGANHANNA  
BABITAION  
   
                                2023                      [facility]  
  
  
FOR RECORDS PERTAINING TO PATIENTS WHO ARE OR HAVE BEEN ENROLLED IN A CHEMICAL 
DEPENDENCY/SUBSTANCEABUSE PROGRAM, SOME INFORMATION MAY BE OMITTED. This 
clinical summary was aggregated from multiple sources. Caution should be 
exercised in using it in the provision of clinical care. This summary normalizes
 information from multiple sources, and as a consequence, information in this 
document may materially change the coding, format and clinical context of 
patient data. In addition, data may be omitted in some cases. CLINICAL DECISIONS
 SHOULD BE BASED ON THE PRIMARY CLINICAL RECORDS. [facility] [employer] provides
 no warranty or guarantee of the accuracy or completeness of information in this
 document.

## 2025-03-07 ENCOUNTER — HOSPITAL ENCOUNTER (OUTPATIENT)
Dept: HOSPITAL 100 - EN | Age: 35
Discharge: HOME | End: 2025-03-07
Payer: COMMERCIAL

## 2025-03-07 VITALS
SYSTOLIC BLOOD PRESSURE: 135 MMHG | RESPIRATION RATE: 16 BRPM | DIASTOLIC BLOOD PRESSURE: 80 MMHG | OXYGEN SATURATION: 98 % | TEMPERATURE: 97.4 F | HEART RATE: 64 BPM

## 2025-03-07 VITALS
SYSTOLIC BLOOD PRESSURE: 135 MMHG | TEMPERATURE: 97.9 F | HEART RATE: 83 BPM | DIASTOLIC BLOOD PRESSURE: 79 MMHG | OXYGEN SATURATION: 100 % | RESPIRATION RATE: 20 BRPM

## 2025-03-07 VITALS
RESPIRATION RATE: 18 BRPM | SYSTOLIC BLOOD PRESSURE: 89 MMHG | OXYGEN SATURATION: 96 % | TEMPERATURE: 98 F | HEART RATE: 76 BPM | DIASTOLIC BLOOD PRESSURE: 54 MMHG

## 2025-03-07 VITALS
DIASTOLIC BLOOD PRESSURE: 79 MMHG | RESPIRATION RATE: 16 BRPM | HEART RATE: 74 BPM | OXYGEN SATURATION: 96 % | SYSTOLIC BLOOD PRESSURE: 135 MMHG

## 2025-03-07 VITALS
HEART RATE: 75 BPM | DIASTOLIC BLOOD PRESSURE: 54 MMHG | OXYGEN SATURATION: 95 % | SYSTOLIC BLOOD PRESSURE: 89 MMHG | TEMPERATURE: 98.06 F | RESPIRATION RATE: 16 BRPM

## 2025-03-07 VITALS
HEART RATE: 83 BPM | OXYGEN SATURATION: 100 % | RESPIRATION RATE: 20 BRPM | DIASTOLIC BLOOD PRESSURE: 79 MMHG | TEMPERATURE: 97.88 F | SYSTOLIC BLOOD PRESSURE: 135 MMHG

## 2025-03-07 VITALS — SYSTOLIC BLOOD PRESSURE: 135 MMHG | DIASTOLIC BLOOD PRESSURE: 79 MMHG

## 2025-03-07 VITALS
SYSTOLIC BLOOD PRESSURE: 135 MMHG | HEART RATE: 71 BPM | RESPIRATION RATE: 16 BRPM | DIASTOLIC BLOOD PRESSURE: 79 MMHG | OXYGEN SATURATION: 95 %

## 2025-03-07 VITALS
HEART RATE: 68 BPM | DIASTOLIC BLOOD PRESSURE: 54 MMHG | OXYGEN SATURATION: 96 % | SYSTOLIC BLOOD PRESSURE: 92 MMHG | RESPIRATION RATE: 16 BRPM

## 2025-03-07 VITALS — BODY MASS INDEX: 25.4 KG/M2

## 2025-03-07 DIAGNOSIS — I10: ICD-10-CM

## 2025-03-07 DIAGNOSIS — Z87.891: ICD-10-CM

## 2025-03-07 DIAGNOSIS — K64.8: ICD-10-CM

## 2025-03-07 DIAGNOSIS — Z80.0: ICD-10-CM

## 2025-03-07 DIAGNOSIS — K62.5: ICD-10-CM

## 2025-03-07 DIAGNOSIS — Z12.11: Primary | ICD-10-CM

## 2025-03-07 DIAGNOSIS — Z83.719: ICD-10-CM

## 2025-03-07 DIAGNOSIS — Z79.899: ICD-10-CM

## 2025-03-07 PROCEDURE — 45378 DIAGNOSTIC COLONOSCOPY: CPT

## 2025-03-07 PROCEDURE — A4216 STERILE WATER/SALINE, 10 ML: HCPCS

## 2025-03-07 PROCEDURE — 0DJD8ZZ INSPECTION OF LOWER INTESTINAL TRACT, VIA NATURAL OR ARTIFICIAL OPENING ENDOSCOPIC: ICD-10-PCS | Performed by: SURGERY

## 2025-03-07 NOTE — OP.COLON_ITS
Patient Name: Todd Rodriges  
Procedure Date: 3/7/2025 11:26 AM  
MRN: [id number]  
YOB: 1990  
Account Number: [id number]  
Age: 34  
Procedure:                    Colonoscopy  
Indications:                  Screening in patient at increased risk: Family   
                              history of 1st-degree relative with colorectal   
                              cancer before age 60 years  
Providers:                    Jonathan Cool MD  
Referring MD:                 Jonathan Cool MD  
Medicines:                    Monitored Anesthesia Care  
Patient Profile:              Refer to note in patient chart for   
                              documentation of history and physical. Last   
                              Colonoscopy: none. The patient's first   
                              colonoscopy is today.  
Complications:                No immediate complications. Estimated blood   
                              loss: None.  
Procedure:                    Pre-Anesthesia Assessment:  
                              - Prior to the procedure, a History and   
                              Physical was performed, and patient medications   
                              and allergies were reviewed. The patient's   
                              tolerance of previous anesthesia was also   
                              reviewed. The risks and benefits of the   
                              procedure and the sedation options and risks   
                              were discussed with the patient. All questions   
                              were answered, and informed consent was   
                              obtained. Prior Anticoagulants: The patient has   
                              taken no anticoagulant or antiplatelet agents.   
                              ASA Grade Assessment: II - A patient with mild   
                              systemic disease. After reviewing the risks and   
                              benefits, the patient was deemed in   
                              satisfactory condition to undergo the procedure.  
                              After I obtained informed consent, the scope   
                              was passed under direct vision. Throughout the   
                              procedure, the patient's blood pressure, pulse,   
                              and oxygen saturations were monitored   
                              continuously. The colonoscope was introduced   
                              through the anus and advanced to the cecum,   
                              identified by appendiceal orifice and ileocecal   
                              valve. The ileocecal valve, appendiceal   
                              orifice, and rectum were photographed. The   
                              entire colon was well visualized. The   
                              colonoscopy was performed without difficulty.   
                              The patient tolerated the procedure well. The   
                              quality of the bowel preparation was adequate.  
Moderate Sedation:            See the other procedure note for documentation   
                              of moderate sedation with intraservice time.  
Scope In: 11:56:01 AM  
Scope Withdrawal Time 0 hours 6 minutes 21 seconds   
Scope Out: 12:14:29 PM  
Total Procedure Duration Time 0 hours 18 minutes 28 seconds   
Findings:  
     The perianal and digital rectal examinations were normal.  
     Internal hemorrhoids were found during endoscopy. The hemorrhoids were   
     moderate.  
     The entire examined colon appeared normal on direct and retroflexion   
     views.  
Impression:                   - Internal hemorrhoids.  
                              - The entire examined colon is normal on direct   
                              and retroflexion views.  
                              - No specimens collected.  
Recommendation:               - Discharge patient to home (ambulatory).  
                              - High fiber diet indefinitely.  
                              - Repeat colonoscopy in 5 years for screening   
                              purposes.  
                              - Return to my office PRN.  
                              - Continue present medications.  
Procedure Code(s):            --- Professional ---  
                              , Colorectal cancer screening; colonoscopy   
                              on individual at high risk  
Diagnosis Code(s):            --- Professional ---  
                              K64.8, Other hemorrhoids  
                              Z80.0, Family history of malignant neoplasm of   
                              digestive organs  
CPT copyright 2022 [employer]. All rights reserved.  
The codes documented in this report are preliminary and upon  review may   
be revised to meet current compliance requirements.  
Jonathan Cool MD  
3/7/2025 12:21:33 PM  
This report has been signed electronically.  
Number of Addenda: 0  
Note Initiated On: 3/7/2025 11:26 AM

## 2025-03-07 NOTE — POSTOPAN2_ITS
Anesthesia Postop Eval I Sum    
Postop Eval Completion status    
Anesthesia document: Postop Eval 1 completed: Yes    
Anesthesia Postop Eval I Summary    
Anesthesia Postop Eval I Summary:     
Anesthesia Postop Eval I:  Assessment Summary    
    
    
    
Airway patent                  Yes                03/07/25 12:31 [email or web address]    
     
Spontaneous unlabored          Yes                03/07/25 12:31 [email or web address]    
    
respirations                         
     
Mental status                  Asleep             03/07/25 12:31 [email or web address]    
     
nausea                         No                 03/07/25 12:31 [email or web address]    
     
Vomiting                       No                 03/07/25 12:31 [email or web address]    
    
    
    
Anesthesia Postop Eval I: Fluid Summary    
    
    
    
Crystalloid volume administer  50                 03/07/25 12:31 [email or web address]    
    
(ml)                                 
     
Colloids volume administered (       
    
ml)                                  
     
Blood Product volume                 
    
administered (ml)                    
     
Total IV fluid infused         50                 03/07/25 12:31 [email or web address]    
    
    
    
Anesthesia Postop Eval I: Summary Notes    
    
    
    
Anesthesia Complication        No                 03/07/25 12:31 [email or web address]    
     
Anesthesia Complication              
    
Comment:                             
     
Post-operative progress note         
    
    
    
    
    
Anesthesia: Postop Eval II    
Evaluation    
Mental status: Awake    
Pain Level: 0    
nausea: No    
Vomiting: No

## 2025-03-07 NOTE — OP.CCLET_ITS
3/7/2025  
Anusha Cao [facility]  
Re : Colonoscopy procedure for Todd Juarezr  Nash  
This procedure was performed on Friday, March 7, 2025.  My impressions and  
 recommendations are as follows:  
Impressions :   
- Internal hemorrhoids.   
- The entire examined colon is normal on direct and retroflexion views.   
- No specimens collected.  
Recommendations :   
- Discharge patient to home (ambulatory).   
- High fiber diet indefinitely.   
- Repeat colonoscopy in 5 years for screening purposes.   
- Return to my office PRN.   
- Continue present medications.  
My findings are described in the full procedure note, which is enclosed.  If I  
 can be of further assistance, please feel free to contact me at .  
Sincerely,  
Jonathan Cool MD  
3/7/2025 12:21:33 PM  
This report has been signed electronically.

## 2025-03-07 NOTE — POSTOP.ANE_ITS
Anesthesia: Postop Eval I    
Current Vital Signs    
Temperature: 98 F    
Pulse Rate: 76    
Blood Pressure: 89/54    
Respiratory Rate: 18    
Pulse Ox: 96    
Oxygen Delivery Method: Room Air    
Assessment    
Airway patent: Yes    
Spontaneous unlabored respirations: Yes    
Mental status: Asleep    
nausea: No    
Vomiting: No    
Anesthesia Complication: No    
Fluid Hydration    
Crystalloid volume administer (ml): 50    
Total IV fluid infused: 50    
Progress Note    
Anesthesia document: Postop Eval 1 completed: Yes

## 2025-03-07 NOTE — PRE.ANES_ITS
ASA Classification*    
ASA Classification    
ASA Classification: 2    
    
Assessment & Plan Anesthesia*    
Anesthesia Assessment    
Anesthesia Assessment:     
    
Discussed sedation and/or anesthesia options, risks, benefits, and alternatives   
with patient/parents/legal guardian/POA. Questions invited.     
    
The patient/parents/legal guardian/POA seems to understand and agrees to proceed  
with anesthesia plan.    
    
Reviewed the physical assessment, medical history, allergy history and patient   
home medications list prior to surgery/procedure/anesthetic and documented any   
changes.    
    
Performed airway and anesthesia risk assessments.    
    
Anesthesia Type    
Anesthesia Type: MAC    
    
Anesthesia Focused Assessment*    
Temperature: 97.9 F    
Pulse Rate: 83    
Blood Pressure: 135/79    
Respiratory Rate: 20    
Pulse Ox: 100    
Airway Assessment    
Mouth opens: >3 cm    
Mallampati Score: II    
    
Focused Labs    
Anesthesia Preop lab:     
    
    
                                                    *** CBC ***    
     
                WBC             8.4 K/mm3 (4.4-11.0)  25 16:43  25    
     
                RBC             5.10 M/mm3 (4.6-6.2)  25 16:43  25    
     
                Hgb             14.8 g/dL (13.0-16.5)  25 16:43  25    
     
                Hct             44.2 % (40-54)  25 16:43  25    
     
                Plt Count       289 K/mm3 (150-450)  25 16:43  25    
     
                                                    *** CHEMISTRY ***    
     
                Potassium       4.1 mmol/L (3.5-5.1)  25 16:43  25    
     
                Sodium          137 mmol/L (136-145)  25 16:43  25    
     
                BUN             15 mg/dL (7-18)  25 16:43  25    
     
                Creatinine      1.25 mg/dL (0.70-1.30)  25 16:43  25    
     
                Glucose         94 mg/dL ()  25 16:43  25    
     
                TSH             0.906 uIU/mL (0.358-3.740)  25 16:43      
     
                                                    *** COAG ***    
     
                                                    *** PREGNANCY ***    
    
    
    
Pre-Assessment    
Diagnosis/Proposed Procedure    
Planned Operative Procedure(s): CSCOPE    
Anesthesia History    
Anesthesia History - RN Documentation:     
                      Anesthesia History - RN Documentation    
    
    
    
Hx Hospitalization             No                           25 08:46    
     
Any Problems With Anesthesia   No                           25 08:46    
     
Cholinesterase deficiency      No                           25 08:46    
     
You/Your Family Experience     No                           25 08:46    
    
fever (hyperthermia) with           
     
Relationship                        
     
Recent Exposure to Contagious  No                           25 09:22    
    
Disease                             
     
Does patient have nerve        No                           25 08:46    
    
stimulator                          
     
Patient instructed to have          
    
device shut off                     
     
--Does patient have Pacemaker  No                           25 09:22    
    
or ICD?                             
     
When Was Last Pacemaker Check       
    
    
    
*** QUESTION #4 FULL TEXT: You/Your Family Experience fever (hyperthermia) with   
Anesthesia    
    
Last Oral Intake    
Last Oral intake:     
                                Last Oral Intake    
    
    
    
NPO since                      21:30                        25 09:22    
     
Meds taken in AM with sips of       
    
water?                              
     
Meds patient instructed to          
    
take am of surgery                  
    
    
    
    
PONV    
PONV - RN Documentation:     
                             PONV - RN Documentation    
    
    
    
Female                         No                           25 08:46    
     
HX of Motion Sickness          Yes                          25 08:46    
     
HX of N/V After Surgery        No                           25 08:46    
     
Non-Smoker                     Yes                          25 08:46    
     
Duration of Surgery greater    No                           25 08:46    
    
than 60 minutes                     
     
Number of Risk Factors         2                            25 08:46    
     
PONV Score                     Moderate Risk                25 08:46    
    
    
    
    
Height & Weight    
Height & Weight:     
                           Anesthesia: Height & Weight    
    
    
    
Height                         5 ft 11 in                   25 09:22    
     
Weight:                        83 kg                        25 09:22    
     
Body Mass Index (BMI)          25.4                         25 09:22    
    
    
    
    
Respiratory Assessment    
Respiratory Assessment - RN Documentation:     
                Respiratory Tract Infection Hx - RN Documentation    
    
    
    
Hx Respiratory Tract Infection No                           25 08:46    
    
    
    
    
STOP Sleep Apnea    
STOP Sleep Apnea - RN Documentation:     
                       STOP Sleep Apnea - RN Documentation    
    
    
    
Hx Hypertension                Yes: CONTROLLED WITH MED     25 08:46    
     
Hx Sleep Apnea                 No                           25 08:46    
     
CPAP                                
     
BIPAP                               
     
Do you snore loudly (louder    No                           25 08:46    
    
than talking or can be heard        
     
Do you often feel tired/       No                           25 08:46    
    
fatigued/ sleepy during             
    
daytime?                            
     
Has anyone observed you stop   No                           25 08:46    
    
breathing during sleep?             
     
STOP Results                   Negative                     25 08:46    
    
    
    
    
*** QUESTION #5 FULL TEXT : Do you snore loudly (louder than talking or can be   
heard through closed doors)?     
    
Tobacco Use History    
Tobacco Use History - RN Documentation:     
                     Tobacco Use History - RN Documentation    
    
    
    
Tobacco Use                         
     
Smoking Status                 Former smoker                25 08:46    
     
Hx Tobacco Use                 No: QUIT SMOKELESS TOBACCO   25 08:46    
    
                               2023                             
     
Years Smoking                       
     
Packs Smoked per Day                
     
Smoking Cessation Date was     Yes - quit smoking within 15 25 08:46    
    
within the last 15 years       years                            
     
Hx Smoking Cessation Date      22 08:46    
     
Hx Smoking Cessation                
    
Counseling                          
    
    
    
    
Hematologic Medial History    
Hematologic Hx - RN Documentation:     
                    Hematologic Medical Hx - RN documentation    
    
    
    
Hx of Blood Transfusion        No                           25 08:46    
     
Hx of Transfusion in last 3    No                           25 08:46    
    
Months                              
     
Date of Last Transfusion (if        
    
within last 3 months)               
     
Ever experience any problems   No                           25 08:46    
    
with transfusion(s)?                
     
Specify any problems                
     
Hx of Preganancy in last 3     N/A                          25 08:46    
    
Months                              
     
Nurse Filling Out Transfusion  NBUCHER                      25 08:46    
    
& Pregnancy Questions:              
     
Date:                          25 08:46    
     
Time:                          08:48                        25 08:46    
     
Patient unable to answer at         
    
this time (ie. confused,            
    
unrespo                             
    
    
    
    
Pregnancy/Reproduction History    
Pregnancy/Reproductive History - RN Documentation:     
                   Pregnancy/Reproductive Hx- RN Documentation    
    
    
    
Hx Pregnant Now                No                           25 08:46    
     
Gestational Age (in weeks):         
     
EDC:                                
     
Hx                           
     
Hx Para                             
     
Hx  Section                 
     
SAB                                 
     
Breastfeeding                  No                           25 08:46    
    
    
    
    
    
PFSH    
Medical History (Reviewed 25 @ 09:28 by Dr. Malick Dickinson MD)    
    
Wears dentures    
Wears glasses    
Anxiety    
Hypertension    
Former smokeless tobacco use    
Gastric reflux    
Former smoker    
Family history of colon cancer    
Rectal bleeding    
    
    
                                Home Medications    
    
    
    
?Medication ?Instructions ?Recorded ?Last Taken ?Type    
     
                          lisinopril 20 mg tablet   20 mg PO DAILY 25 History    
    
    
    
                                            
    
    
    
Allergy/AdvReac Type Severity Reaction Status Date / Time    
     
No Known Allergies Allergy   Verified 25 09:19    
    
    
    
Surgical History (Reviewed 25 @ 09:28 by Dr. Malick Dickinson MD)    
    
History of foot surgery    
    
    
Social History (Reviewed 25 @ 09:28 by Dr. Malick Dickinson MD)    
Smoking Status:  Former smoker     
    
    
    
Review of Systems (Anesthesia)    
ROS Narrative    
System reviewed and no additional complaints, except as documented.

## 2025-03-07 NOTE — PCM.PRE.AN2
ASA Classification*
ASA Classification
ASA Classification: 2

Assessment & Plan Anesthesia*
Anesthesia Assessment
Anesthesia Assessment: 

Discussed sedation and/or anesthesia options, risks, benefits, and alternatives with patient/parents/legal guardian/POA. Questions invited. 

The patient/parents/legal guardian/POA seems to understand and agrees to proceed with anesthesia plan.

Reviewed the physical assessment, medical history, allergy history and patient home medications list prior to surgery/procedure/anesthetic and documented any changes.

Performed airway and anesthesia risk assessments.

Anesthesia Type
Anesthesia Type: MAC

Anesthesia Focused Assessment*
Temperature: 97.9 F
Pulse Rate: 83
Blood Pressure: 135/79
Respiratory Rate: 20
Pulse Ox: 100
Airway Assessment
Mouth opens: >3 cm
Mallampati Score: II

Focused Labs
Anesthesia Preop lab: 
       *** CBC ***  
      WBC 8.4 K/mm3 (4.4-11.0)  25 16:43 25
      RBC 5.10 M/mm3 (4.6-6.2)  25 16:43 25
      Hgb 14.8 g/dL (13.0-16.5)  25 16:43 25
      Hct 44.2 % (40-54)  25 16:43 25
      Plt Count 289 K/mm3 (150-450)  25 16:43 25
       *** CHEMISTRY ***  
      Potassium 4.1 mmol/L (3.5-5.1)  25 16:43 25
      Sodium 137 mmol/L (136-145)  25 16:43 25
      BUN 15 mg/dL (7-18)  25 16:43 25
      Creatinine 1.25 mg/dL (0.70-1.30)  25 16:43 25
      Glucose 94 mg/dL ()  25 16:43 25
      TSH 0.906 uIU/mL (0.358-3.740)  25 16:43 25
       *** COAG ***  
       *** PREGNANCY ***  


Pre-Assessment
Diagnosis/Proposed Procedure
Planned Operative Procedure(s): CSCOPE
Anesthesia History
Anesthesia History - RN Documentation: 
Anesthesia History - RN Documentation

Hx Hospitalization             No                           25 08:46
Any Problems With Anesthesia   No                           25 08:46
Cholinesterase deficiency      No                           25 08:46
You/Your Family Experience     No                           25 08:46
fever (hyperthermia) with       
Relationship                    
Recent Exposure to Contagious  No                           25 09:22
Disease                         
Does patient have nerve        No                           25 08:46
stimulator                      
Patient instructed to have      
device shut off                 
--Does patient have Pacemaker  No                           25 09:22
or ICD?                         
When Was Last Pacemaker Check   


*** QUESTION #4 FULL TEXT: You/Your Family Experience fever (hyperthermia) with Anesthesia

Last Oral Intake
Last Oral intake: 
Last Oral Intake

NPO since                      21:30                        25 09:22
Meds taken in AM with sips of   
water?                          
Meds patient instructed to      
take am of surgery              



PONV
PONV - RN Documentation: 
PONV - RN Documentation

Female                         No                           25 08:46
HX of Motion Sickness          Yes                          25 08:46
HX of N/V After Surgery        No                           25 08:46
Non-Smoker                     Yes                          25 08:46
Duration of Surgery greater    No                           25 08:46
than 60 minutes                 
Number of Risk Factors         2                            25 08:46
PONV Score                     Moderate Risk                25 08:46



Height & Weight
Height & Weight: 
Anesthesia: Height & Weight

Height                         5 ft 11 in                   25 09:22
Weight:                        83 kg                        25 09:22
Body Mass Index (BMI)          25.4                         25 09:22



Respiratory Assessment
Respiratory Assessment - RN Documentation: 
Respiratory Tract Infection Hx - RN Documentation

Hx Respiratory Tract Infection No                           25 08:46



STOP Sleep Apnea
STOP Sleep Apnea - RN Documentation: 
STOP Sleep Apnea - RN Documentation

Hx Hypertension                Yes: CONTROLLED WITH MED     25 08:46
Hx Sleep Apnea                 No                           25 08:46
CPAP                            
BIPAP                           
Do you snore loudly (louder    No                           25 08:46
than talking or can be heard    
Do you often feel tired/       No                           25 08:46
fatigued/ sleepy during         
daytime?                        
Has anyone observed you stop   No                           25 08:46
breathing during sleep?         
STOP Results                   Negative                     25 08:46



*** QUESTION #5 FULL TEXT : Do you snore loudly (louder than talking or can be heard through closed doors)? 

Tobacco Use History
Tobacco Use History - RN Documentation: 
Tobacco Use History - RN Documentation

Tobacco Use                     
Smoking Status                 Former smoker                25 08:46
Hx Tobacco Use                 No: QUIT SMOKELESS TOBACCO   25 08:46
                               2023                         
Years Smoking                   
Packs Smoked per Day            
Smoking Cessation Date was     Yes - quit smoking within 15 25 08:46
within the last 15 years       years                        
Hx Smoking Cessation Date      22 08:46
Hx Smoking Cessation            
Counseling                      



Hematologic Medial History
Hematologic Hx - RN Documentation: 
Hematologic Medical Hx - RN documentation

Hx of Blood Transfusion        No                           25 08:46
Hx of Transfusion in last 3    No                           25 08:46
Months                          
Date of Last Transfusion (if    
within last 3 months)           
Ever experience any problems   No                           25 08:46
with transfusion(s)?            
Specify any problems            
Hx of Preganancy in last 3     N/A                          25 08:46
Months                          
Nurse Filling Out Transfusion  NBUCHER                      25 08:46
& Pregnancy Questions:          
Date:                          25 08:46
Time:                          08:48                        25 08:46
Patient unable to answer at     
this time (ie. confused,        
unrespo                         



Pregnancy/Reproduction History
Pregnancy/Reproductive History - RN Documentation: 
Pregnancy/Reproductive Hx- RN Documentation

Hx Pregnant Now                No                           25 08:46
Gestational Age (in weeks):     
EDC:                            
Hx                       
Hx Para                         
Hx  Section             
SAB                             
Breastfeeding                  No                           25 08:46




PFSH
Medical History (Reviewed 25 @ 09:28 by Dr. Malick Dickinson MD)

Wears dentures
Wears glasses
Anxiety
Hypertension
Former smokeless tobacco use
Gastric reflux
Former smoker
Family history of colon cancer
Rectal bleeding


Home Medications

?Medication ?Instructions ?Recorded ?Last Taken ?Type
lisinopril 20 mg tablet 20 mg PO DAILY 25 History




Allergy/AdvReac Type Severity Reaction Status Date / Time
No Known Allergies Allergy   Verified 25 09:19


Surgical History (Reviewed 25 @ 09:28 by Dr. Malick Dickinson MD)

History of foot surgery


Social History (Reviewed 25 @ 09:28 by Dr. Malick Dickinson MD)
Smoking Status:  Former smoker 



Review of Systems (Anesthesia)
ROS Narrative
System reviewed and no additional complaints, except as documented. 06-Aug-2023 16:18

## 2025-03-07 NOTE — HP.PCM_ITS
HPI - General    
General    
Date of Admission: 03/07/25    
Date of Service: 03/07/25    
Chief Complaint: Screening colonoscopy    
HPI Narrative    
AZAR BLANCHARD, is a 34 M who presents for screening colonoscopy.  He has a   
family history of colon polyps and colon cancer.  His mom has had colon polyps   
and his dad developed colon cancer in his late 40s.  Patient wishes to be   
proactive and was scheduled for colonoscopy.  He denies any significant GI   
issues or problems other than some occasional blood with wiping during bowel   
movements.    
    
    
[facility]    
Medical History (Reviewed 03/07/25 @ 11:46 by Dr. Jonathan Cool MD)    
    
Wears dentures    
Wears glasses    
Anxiety    
Hypertension    
Former smokeless tobacco use    
Gastric reflux    
Former smoker    
Family history of colon cancer    
Rectal bleeding    
    
    
                                Home Medications    
    
    
    
?Medication ?Instructions ?Recorded ?Last Taken ?Type    
     
                          lisinopril 20 mg tablet   20 mg PO DAILY 03/06/25 03/0 6/25 History    
    
    
    
                                            
    
    
    
Allergy/AdvReac Type Severity Reaction Status Date / Time    
     
No Known Allergies Allergy   Verified 03/07/25 09:19    
    
    
    
Surgical History (Reviewed 03/07/25 @ 11:46 by Dr. Jonathan Cool MD)    
    
History of foot surgery    
    
    
Social History (Reviewed 03/07/25 @ 11:46 by Dr. Jonathan Cool MD)    
Smoking Status:  Former smoker     
    
    
    
Vital Signs    
Vital Signs    
Vital Signs:     
                                            
    
    
    
 03/07/25    
09:22 03/07/25    
09:22 03/07/25    
09:28    
     
Temperature  97.9 F 97.9 F    
     
Temperature Source  Temporal     
     
Pulse Rate  83 83    
     
Respiratory Rate  20 H 20 H    
     
Respiratory Pattern Normal      
     
Blood Pressure  135/79 H 135/79 H    
     
Blood Pressure Mean  97     
     
Blood Pressure Source  Monitor     
     
Blood Pressure Position  Semi-Fowlers     
     
Blood Pressure Location  Left Arm     
     
Pulse Ox  100 100    
     
Oxygen Delivery Method  Room Air     
    
    
                                     Weight    
    
    
    
Weight:                        182 lb 15.739 oz                                   
    
     
Body Mass Index (BMI)          25.4                                               
    
    
    
    
    
    
Physical Exam    
Const    
alert, oriented x3 and no apparent distress    
HEENT    
normocephalic    
Eyes    
PERRL    
General Eye: normal appearance of both eyes    
Neck    
full ROM    
    
Assessment & Plan    
Assessment/Plan    
(1) Rectal bleeding:     
PLAN:     
Plan    
The patient is a 34-year-old male with family history of colon polyps and colon   
cancer.  He has had some periodic rectal bleeding.  He has been recommended to   
undergo colonoscopy.  We discussed the details of the planned procedure and he   
wishes to proceed.  This will begin momentarily

## 2025-03-07 NOTE — PCM.HP.STD
HPI - General
General
Date of Admission: 03/07/25
Date of Service: 03/07/25
Chief Complaint: Screening colonoscopy
HPI Narrative
AZAR BLANCHARD, is a 34 M who presents for screening colonoscopy.  He has a family history of colon polyps and colon cancer.  His mom has had colon polyps and his dad developed colon cancer in his late 40s.  Patient wishes to be proactive and was 
scheduled for colonoscopy.  He denies any significant GI issues or problems other than some occasional blood with wiping during bowel movements.


[facility]
Medical History (Reviewed 03/07/25 @ 11:46 by Dr. Jonathan Cool MD)

Wears dentures
Wears glasses
Anxiety
Hypertension
Former smokeless tobacco use
Gastric reflux
Former smoker
Family history of colon cancer
Rectal bleeding


Home Medications

?Medication ?Instructions ?Recorded ?Last Taken ?Type
lisinopril 20 mg tablet 20 mg PO DAILY 03/06/25 03/06/25 History




Allergy/AdvReac Type Severity Reaction Status Date / Time
No Known Allergies Allergy   Verified 03/07/25 09:19


Surgical History (Reviewed 03/07/25 @ 11:46 by Dr. Jonathan Cool MD)

History of foot surgery


Social History (Reviewed 03/07/25 @ 11:46 by Dr. Jonathan Cool MD)
Smoking Status:  Former smoker 



Vital Signs
Vital Signs
Vital Signs: 


 03/07/25
09:22 03/07/25
09:22 03/07/25
09:28
Temperature  97.9 F 97.9 F
Temperature Source  Temporal 
Pulse Rate  83 83
Respiratory Rate  20 H 20 H
Respiratory Pattern Normal  
Blood Pressure  135/79 H 135/79 H
Blood Pressure Mean  97 
Blood Pressure Source  Monitor 
Blood Pressure Position  Semi-Fowlers 
Blood Pressure Location  Left Arm 
Pulse Ox  100 100
Oxygen Delivery Method  Room Air 

Weight

Weight:                        182 lb 15.739 oz                                  
Body Mass Index (BMI)          25.4                                              




Physical Exam
Const
alert, oriented x3 and no apparent distress
HEENT
normocephalic
Eyes
PERRL
General Eye: normal appearance of both eyes
Neck
full ROM

Assessment & Plan
Assessment/Plan
(1) Rectal bleeding: 
PLAN: 
Plan
The patient is a 34-year-old male with family history of colon polyps and colon cancer.  He has had some periodic rectal bleeding.  He has been recommended to undergo colonoscopy.  We discussed the details of the planned procedure and he wishes to 
proceed.  This will begin momentarily
